# Patient Record
Sex: MALE | Race: BLACK OR AFRICAN AMERICAN | NOT HISPANIC OR LATINO | URBAN - METROPOLITAN AREA
[De-identification: names, ages, dates, MRNs, and addresses within clinical notes are randomized per-mention and may not be internally consistent; named-entity substitution may affect disease eponyms.]

---

## 2023-02-20 ENCOUNTER — EMERGENCY (EMERGENCY)
Facility: HOSPITAL | Age: 28
LOS: 0 days | Discharge: HOME | End: 2023-02-20
Payer: MEDICAID

## 2023-02-20 VITALS
HEIGHT: 71 IN | HEART RATE: 69 BPM | TEMPERATURE: 99 F | RESPIRATION RATE: 20 BRPM | OXYGEN SATURATION: 98 % | DIASTOLIC BLOOD PRESSURE: 70 MMHG | WEIGHT: 123.46 LBS | SYSTOLIC BLOOD PRESSURE: 113 MMHG

## 2023-02-20 DIAGNOSIS — R21 RASH AND OTHER NONSPECIFIC SKIN ERUPTION: ICD-10-CM

## 2023-02-20 DIAGNOSIS — F17.200 NICOTINE DEPENDENCE, UNSPECIFIED, UNCOMPLICATED: ICD-10-CM

## 2023-02-20 DIAGNOSIS — L73.9 FOLLICULAR DISORDER, UNSPECIFIED: ICD-10-CM

## 2023-02-20 DIAGNOSIS — L29.9 PRURITUS, UNSPECIFIED: ICD-10-CM

## 2023-02-20 PROCEDURE — 99284 EMERGENCY DEPT VISIT MOD MDM: CPT

## 2023-02-20 RX ORDER — DIPHENHYDRAMINE HCL 50 MG
1 CAPSULE ORAL
Qty: 42 | Refills: 0
Start: 2023-02-20 | End: 2023-02-26

## 2023-02-20 RX ORDER — CHLORHEXIDINE GLUCONATE 213 G/1000ML
1 SOLUTION TOPICAL
Qty: 1 | Refills: 0
Start: 2023-02-20 | End: 2023-03-01

## 2023-02-20 RX ORDER — DEXAMETHASONE 0.5 MG/5ML
10 ELIXIR ORAL ONCE
Refills: 0 | Status: COMPLETED | OUTPATIENT
Start: 2023-02-20 | End: 2023-02-20

## 2023-02-20 RX ORDER — LORATADINE 10 MG/1
10 TABLET ORAL DAILY
Refills: 0 | Status: DISCONTINUED | OUTPATIENT
Start: 2023-02-20 | End: 2023-02-20

## 2023-02-20 RX ADMIN — Medication 10 MILLIGRAM(S): at 18:47

## 2023-02-20 RX ADMIN — LORATADINE 10 MILLIGRAM(S): 10 TABLET ORAL at 18:47

## 2023-02-20 NOTE — ED PROVIDER NOTE - PROVIDER TOKENS
PROVIDER:[TOKEN:[[id number]],FOLLOWUP:[1-3 Days]],PROVIDER:[TOKEN:[[id number]],FOLLOWUP:[1-3 Days]],PROVIDER:[TOKEN:[[id number]],FOLLOWUP:[1-3 Days]]

## 2023-02-20 NOTE — ED ADULT TRIAGE NOTE - CHIEF COMPLAINT QUOTE
patient present with generalized itchy rash which is more in his private areas and which leaves black scars for over a month. patient was given medications in [place] but cannot remember the name, he is only in country 1 [place] now.

## 2023-02-20 NOTE — ED PROVIDER NOTE - CARE PROVIDERS DIRECT ADDRESSES
,DirectAddress_Unknown,[email or web address],DirectAddress_Unknown

## 2023-02-20 NOTE — ED PROVIDER NOTE - CLINICAL SUMMARY MEDICAL DECISION MAKING FREE TEXT BOX
+numerous pruritic brown macules and papules which began in groin area after shaving that area and then spread out to buttock, legs, back.   no neck/face involvement.   ?folliculitis.   will treat with bactrim, give benadryl for itching and hibiclens to decolonize.  will need derm f/u.   Reviewed necessity for follow up. Counseled on red flags and to return for them.  Patient appears well on discharge.

## 2023-02-20 NOTE — ED PROVIDER NOTE - PATIENT PORTAL LINK FT
You can access the FollowMyHealth Patient Portal offered by [facility] by registering at the following website: [email or web address]. By joining [facility]’s FollowMyHealth portal, you will also be able to view your health information using other applications (apps) compatible with our system.

## 2023-02-20 NOTE — ED PROVIDER NOTE - NS ED ROS FT
Review of Systems    Constitutional: (-) fever   Eyes/ENT: (-) vision changes  Cardiovascular: (-) chest pain, (-) syncope (-) palpitations  Respiratory: (-) cough, (-) shortness of breath  Gastrointestinal: (-) vomiting, (-) diarrhea (-) abdominal pain  Genitourinary:  (-) dysuria   Musculoskeletal: (-) neck pain, (-) back pain, (-) leg pain/swelling  Integumentary: see hpi   Neurological: (-) headache,  Allergic/Immunologic: (+) pruritus

## 2023-02-20 NOTE — ED PROVIDER NOTE - OBJECTIVE STATEMENT
27-year-old male without PMH, daily smoker, presents with generalized itchy rash x 1 month, which began in pubic hair s/p shaving that area, and then spread throughout.  Some areas of it are becoming now hyperpigmented, and he is concerned about cosmetics.   No fever, nausea, vomiting, diarrhea, abdominal pain, chest pain, shortness of breath, throat closing sensation, anyone around him with similar rash.  He has used some hydrocortisone cream without benefit.

## 2023-02-20 NOTE — ED ADULT NURSE NOTE - OBJECTIVE STATEMENT
patient c/o having  itchy rash over body but mainly in his genitalia and groin areas, lower abdomen which leaves black spots/scars. Denies penile discharge. added that condition exist for over a month and he was being treated in his country but cannot remember the name of the medication.

## 2023-02-20 NOTE — ED PROVIDER NOTE - PHYSICAL EXAMINATION
PHYSICAL EXAM:    GENERAL: Alert, appears stated age, well appearing, non-toxic  SKIN: Warm, and dry. MMM. +diffuse numerous brown macules and papules/pustules in groin, buttock, b/l legs, torso in follicular distribution. no other areas. no evidence of excoriation of superinfection.   no neck/face involvement  HEAD: NC, AT  EYE: Normal lids/conjunctiva  ENT: Normal hearing, patent oropharynx   NECK: +supple. No meningismus, or JVD   Pulm: Bilateral BS, normal resp effort, no wheezes, stridor, or retractions  CV: RRR, no M/R/G, 2+and = radial pulses  Abd: soft, non-tender, non-distended  Mskel: no erythema, cyanosis, edema. no calf tenderness  Neuro: AAOx3, normal gait

## 2023-02-20 NOTE — ED ADULT TRIAGE NOTE - ACCOMPANIED BY
[FreeTextEntry1] : The above was discussed at length with the patient who has an excellent understanding of the issues.  Self

## 2023-02-20 NOTE — ED ADULT NURSE NOTE - CHIEF COMPLAINT QUOTE
patient present with generalized itchy rash which is more in his private areas and which leaves black scars for over a month. patient was given medications in [place] but cannot remember the name, he is only in country 1 month now.

## 2023-02-20 NOTE — ED PROVIDER NOTE - CARE PROVIDER_API CALL
Jaja Lanier  DERMATOLOGY  [address] [place] [address], [address]  [place] [address]  Phone: [phone]  Fax: [phone]  Follow Up Time: 1-3 Days    Matt Mcgovern  INTERNAL MEDICINE  [address] [place] [address]  Phone: [phone]  Fax: [phone]  Follow Up Time: 1-3 Days    Carroll Alexander  DERMATOLOGY  [address] [facility] [address] [facility] [address]  [place] [address]  Phone: [phone]  Fax: [phone]  Follow Up Time: 1-3 Days

## 2025-06-26 NOTE — ED ADULT NURSE NOTE - NSFALLRSKASSESASSIST_ED_ALL_ED
ELLA OLIVAREZ  [address] [facility] [address]  [place] [address]  Phone: [phone]  Fax: ([phone]  Follow Up Time:     Zak Thomas  Surgery (General Surgery)  [address] [facility], [address]  [place] [address]  Phone: [phone]  Fax: [phone]  Follow Up Time:     Audi Crowe  Gastroenterology  [address] [place] [address]  Phone: [phone]  Follow Up Time:   
no